# Patient Record
Sex: MALE | Race: WHITE | Employment: STUDENT | URBAN - METROPOLITAN AREA
[De-identification: names, ages, dates, MRNs, and addresses within clinical notes are randomized per-mention and may not be internally consistent; named-entity substitution may affect disease eponyms.]

---

## 2021-03-11 ENCOUNTER — APPOINTMENT (OUTPATIENT)
Dept: GENERAL RADIOLOGY | Age: 26
End: 2021-03-11
Attending: EMERGENCY MEDICINE
Payer: COMMERCIAL

## 2021-03-11 ENCOUNTER — HOSPITAL ENCOUNTER (EMERGENCY)
Age: 26
Discharge: HOME | End: 2021-03-11
Payer: COMMERCIAL

## 2021-03-11 VITALS
SYSTOLIC BLOOD PRESSURE: 148 MMHG | TEMPERATURE: 98.7 F | HEIGHT: 72 IN | HEART RATE: 83 BPM | OXYGEN SATURATION: 99 % | RESPIRATION RATE: 16 BRPM | BODY MASS INDEX: 23.7 KG/M2 | DIASTOLIC BLOOD PRESSURE: 86 MMHG | WEIGHT: 175 LBS

## 2021-03-11 DIAGNOSIS — S61.011A LACERATION OF RIGHT THUMB WITHOUT FOREIGN BODY WITHOUT DAMAGE TO NAIL, INITIAL ENCOUNTER: Primary | ICD-10-CM

## 2021-03-11 PROCEDURE — 90471 IMMUNIZATION ADMIN: CPT

## 2021-03-11 PROCEDURE — 90715 TDAP VACCINE 7 YRS/> IM: CPT | Performed by: NURSE PRACTITIONER

## 2021-03-11 PROCEDURE — 74011250637 HC RX REV CODE- 250/637: Performed by: NURSE PRACTITIONER

## 2021-03-11 PROCEDURE — 75810000293 HC SIMP/SUPERF WND  RPR

## 2021-03-11 PROCEDURE — 99283 EMERGENCY DEPT VISIT LOW MDM: CPT

## 2021-03-11 PROCEDURE — 73140 X-RAY EXAM OF FINGER(S): CPT

## 2021-03-11 PROCEDURE — 74011250636 HC RX REV CODE- 250/636: Performed by: NURSE PRACTITIONER

## 2021-03-11 RX ORDER — IBUPROFEN 800 MG/1
800 TABLET ORAL
Status: COMPLETED | OUTPATIENT
Start: 2021-03-11 | End: 2021-03-11

## 2021-03-11 RX ORDER — ACETAMINOPHEN 325 MG/1
650 TABLET ORAL ONCE
Status: COMPLETED | OUTPATIENT
Start: 2021-03-11 | End: 2021-03-11

## 2021-03-11 RX ADMIN — ACETAMINOPHEN 650 MG: 325 TABLET ORAL at 13:36

## 2021-03-11 RX ADMIN — TETANUS TOXOID, REDUCED DIPHTHERIA TOXOID AND ACELLULAR PERTUSSIS VACCINE, ADSORBED 0.5 ML: 5; 2.5; 8; 8; 2.5 SUSPENSION INTRAMUSCULAR at 13:36

## 2021-03-11 RX ADMIN — IBUPROFEN 800 MG: 800 TABLET ORAL at 13:36

## 2021-03-11 NOTE — DISCHARGE INSTRUCTIONS
Thank you! Thank you for allowing me to care for you in the emergency department. I sincerely hope that you are satisfied with your visit today. It is my goal to provide you with excellent care. Below you will find a list of your labs and imaging from your visit today. Should you have any questions regarding these results please do not hesitate to call the emergency department. Labs -   No results found for this or any previous visit (from the past 12 hour(s)). Radiologic Studies -   XR THUMB LT MIN 2 V   Final Result   Negative. No fracture. CT Results  (Last 48 hours)      None          CXR Results  (Last 48 hours)      None               If you feel that you have not received excellent quality care or timely care, please ask to speak to the nurse manager. Please choose us in the future for your continued health care needs. ------------------------------------------------------------------------------------------------------------  The exam and treatment you received in the Emergency Department were for an urgent problem and are not intended as complete care. It is important that you follow-up with a doctor, nurse practitioner, or physician assistant to:  (1) confirm your diagnosis,  (2) re-evaluation of changes in your illness and treatment, and  (3) for ongoing care. If your symptoms become worse or you do not improve as expected and you are unable to reach your usual health care provider, you should return to the Emergency Department. We are available 24 hours a day. Please take your discharge instructions with you when you go to your follow-up appointment. If you have any problem arranging a follow-up appointment, contact the Emergency Department immediately. If a prescription has been provided, please have it filled as soon as possible to prevent a delay in treatment. Read the entire medication instruction sheet provided to you by the pharmacy.  If you have any questions or reservations about taking the medication due to side effects or interactions with other medications, please call your primary care physician or contact the ER to speak with the charge nurse. Make an appointment with your family doctor or the physician you were referred to for follow-up of this visit as instructed on your discharge paperwork, as this is a mandatory follow-up. Return to the ER if you are unable to be seen or if you are unable to be seen in a timely manner. If you have any problem arranging the follow-up visit, contact the Emergency Department immediately.

## 2021-03-11 NOTE — ED PROVIDER NOTES
EMERGENCY DEPARTMENT HISTORY AND PHYSICAL EXAM      Date: 3/11/2021  Patient Name: Sophie Hdez    History of Presenting Illness     Chief Complaint   Patient presents with    Laceration       History Provided By: Patient    HPI: Sophie Hdez, 26 y.o. male with a past medical history significant testicular CA presents to the ED with cc of laceration to right thumb. Reports was using electric chain saw to cut down trees when it accidentally slipped out his hand falling on top of his right thumb. He reports having gloves on at this time and try to catch the chainsaw when the blade tapped his thumb. He complains of bleeding that is controlled and pain with movement. Throbbing 6/10. denies any numbness or tingling unsure of last Tdap shot. Off note patient reports currently on Suboxone    There are no other complaints, changes, or physical findings at this time. PCP: None    No current facility-administered medications on file prior to encounter. Current Outpatient Medications on File Prior to Encounter   Medication Sig Dispense Refill    methylPREDNISolone (MEDROL, TAINA,) 4 mg tablet Per dose pack instructions 1 Each 0    oxyCODONE IR (ROXICODONE) 10 mg tab immediate release tablet Take 1 Tab by mouth every six (6) hours as needed. Max Daily Amount: 40 mg. 20 Tab 0    silver sulfADIAZINE (SILVADENE) 1 % topical cream Apply to affected area twice a day and then cover with dry guaze 50 g 0       Past History     Past Medical History:  No past medical history on file. Past Surgical History:  No past surgical history on file. Family History:  No family history on file. Social History:  Social History     Tobacco Use    Smoking status: Not on file   Substance Use Topics    Alcohol use: Not on file    Drug use: Not on file       Allergies: Allergies   Allergen Reactions    Amoxil [Amoxicillin] Rash         Review of Systems     Review of Systems   Constitutional: Negative for chills and fever. Musculoskeletal:        Join pain   Skin: Positive for wound. Neurological: Negative for dizziness and light-headedness. Physical Exam   *Physical Exam  Constitutional:       General: He is not in acute distress. Appearance: He is normal weight. HENT:      Head: Normocephalic. Eyes:      Extraocular Movements: Extraocular movements intact. Cardiovascular:      Pulses: Normal pulses. Musculoskeletal:        Arms:    Skin:     General: Skin is warm. Capillary Refill: Capillary refill takes less than 2 seconds. Findings: Abrasion present. Comments: Right thumb   Neurological:      Mental Status: He is alert and oriented to person, place, and time. Lab and Diagnostic Study Results     Labs -   No results found for this or any previous visit (from the past 12 hour(s)). Radiologic Studies -   XR Results (most recent):  Results from Hospital Encounter encounter on 03/11/21   XR THUMB LT MIN 2 V    Narrative AP left hand and AP and lateral views left thumb    HISTORY: Injury    No fracture or dislocation. The joint spaces of the hand are maintained. No  focal soft tissue abnormality. Impression Negative. No fracture. CT Results  (Last 48 hours)    None        CXR Results  (Last 48 hours)    None            Medical Decision Making   - I am the first provider for this patient. - I reviewed the vital signs, available nursing notes, past medical history, past surgical history, family history and social history. - Initial assessment performed. The patients presenting problems have been discussed, and they are in agreement with the care plan formulated and outlined with them. I have encouraged them to ask questions as they arise throughout their visit. Vital Signs-Reviewed the patient's vital signs.   Patient Vitals for the past 12 hrs:   Temp Pulse Resp BP SpO2   03/11/21 1150 98.7 °F (37.1 °C) 83 16 (!) 148/86 99 %       The patient presents with lacertion with a differential diagnosis of abrasion, bony injury, wound infection      ED Course:          Provider Notes (Medical Decision Making):   Patient hemodynamically stable, laceration superficial abrasion noted to right thumb, no injury or bony point tenderness 2+ radial brachial pulses less than 2-second capillary refill range of motion limited due to pain. No injury to nailbed noted updated on tetanus advised the use of topical antibacterial ointment follow-up with PCP verbalized understanding stable at time of discharge    Procedures   Medical Decision Makingedical Decision Making  Performed by: Lanre Rodriguez NP  PROCEDURES:  Wound Repair    Date/Time: 3/11/2021 1:46 PM  Performed by: NPPreparation: skin prepped with Betadine  Location details: right thumb  Wound length:2.5 cm or less  Foreign bodies: no foreign bodies  Irrigation solution: saline  Debridement: moderate  Skin closure: glue  Patient tolerance: patient tolerated the procedure well with no immediate complications  My total time at bedside, performing this procedure was 1-15 minutes. Disposition   Disposition:     Discharged    DISCHARGE PLAN:  1. Current Discharge Medication List      CONTINUE these medications which have NOT CHANGED    Details   methylPREDNISolone (MEDROL, TAINA,) 4 mg tablet Per dose pack instructions  Qty: 1 Each, Refills: 0      oxyCODONE IR (ROXICODONE) 10 mg tab immediate release tablet Take 1 Tab by mouth every six (6) hours as needed. Max Daily Amount: 40 mg.  Qty: 20 Tab, Refills: 0      silver sulfADIAZINE (SILVADENE) 1 % topical cream Apply to affected area twice a day and then cover with dry guaze  Qty: 50 g, Refills: 0           2. Follow-up Information     Follow up With Specialties Details Why Avni Ho MD [facility] In 3 days For wound re-check [address] [place] [address]  [phone]          3. Return to ED if worse   4.    Current Discharge Medication List            Diagnosis     Clinical Impression:   1. Laceration of right thumb without foreign body without damage to nail, initial encounter        Attestations:    Lilo Guallpa, NP    Please note that this dictation was completed with [employer], the computer voice recognition software. Quite often unanticipated grammatical, syntax, homophones, and other interpretive errors are inadvertently transcribed by the computer software. Please disregard these errors. Please excuse any errors that have escaped final proofreading. Thank you.

## 2023-05-14 RX ORDER — METHYLPREDNISOLONE 4 MG/1
TABLET ORAL
COMMUNITY
Start: 2015-08-09

## 2023-05-14 RX ORDER — OXYCODONE HYDROCHLORIDE 10 MG/1
10 TABLET ORAL EVERY 6 HOURS PRN
COMMUNITY
Start: 2015-08-09